# Patient Record
Sex: MALE | Race: BLACK OR AFRICAN AMERICAN | Employment: FULL TIME | URBAN - METROPOLITAN AREA
[De-identification: names, ages, dates, MRNs, and addresses within clinical notes are randomized per-mention and may not be internally consistent; named-entity substitution may affect disease eponyms.]

---

## 2023-02-27 ENCOUNTER — OFFICE VISIT (OUTPATIENT)
Dept: ORTHOPEDIC SURGERY | Age: 43
End: 2023-02-27
Payer: COMMERCIAL

## 2023-02-27 VITALS — HEIGHT: 68 IN | BODY MASS INDEX: 36.98 KG/M2 | WEIGHT: 244 LBS

## 2023-02-27 DIAGNOSIS — M25.562 KNEE MENISCUS PAIN, LEFT: ICD-10-CM

## 2023-02-27 DIAGNOSIS — M25.562 LEFT MEDIAL KNEE PAIN: ICD-10-CM
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
## 2023-02-27 DIAGNOSIS — S83.242A ACUTE MEDIAL MENISCAL TEAR, LEFT, INITIAL ENCOUNTER: ICD-10-CM

## 2023-02-27 DIAGNOSIS — M25.562 ACUTE PAIN OF LEFT KNEE: Primary | ICD-10-CM

## 2023-02-27 PROCEDURE — 99204 OFFICE O/P NEW MOD 45 MIN: CPT | Performed by: ORTHOPAEDIC SURGERY
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
## 2023-03-07 ENCOUNTER — OFFICE VISIT (OUTPATIENT)
Dept: ORTHOPEDIC SURGERY | Age: 43
End: 2023-03-07
Payer: COMMERCIAL

## 2023-03-07 VITALS — HEIGHT: 68 IN | WEIGHT: 244 LBS | BODY MASS INDEX: 36.98 KG/M2

## 2023-03-07 DIAGNOSIS — M25.562 KNEE MENISCUS PAIN, LEFT: ICD-10-CM

## 2023-03-07 DIAGNOSIS — M25.562 LEFT MEDIAL KNEE PAIN: ICD-10-CM

## 2023-03-07 DIAGNOSIS — M25.562 ACUTE PAIN OF LEFT KNEE: Primary | ICD-10-CM

## 2023-03-07 DIAGNOSIS — M22.42 PATELLA, CHONDROMALACIA, LEFT: ICD-10-CM

## 2023-03-07 DIAGNOSIS — S83.242D ACUTE MEDIAL MENISCUS TEAR, LEFT, SUBSEQUENT ENCOUNTER: ICD-10-CM

## 2023-03-07 DIAGNOSIS — S83.282D TEAR OF LATERAL MENISCUS OF LEFT KNEE, SUBSEQUENT ENCOUNTER: ICD-10-CM

## 2023-03-07 DIAGNOSIS — M25.562 LEFT LATERAL KNEE PAIN: ICD-10-CM

## 2023-03-07 PROCEDURE — 99214 OFFICE O/P EST MOD 30 MIN: CPT | Performed by: ORTHOPAEDIC SURGERY

## 2023-03-07 NOTE — PROGRESS NOTES
Deanna Landrum (: 1980) is a 39 y.o. male, patient, here for evaluation of the following chief complaint(s):  Knee Pain (left)       HPI:    He was last seen for his left knee pain on 2023. Since then, the patient did have an MRI performed on his left knee on 3/4/2023. The patient states that his pain level is the same as it was at his last visit. He rates the severity of his left knee pain as a 5 out of 10. He describes his left knee pain as sharp, aching, and constant. His left knee pain does not wake him up from sleep at night. He has been experiencing some swelling and weakness in his left knee. The patient reports taking no medication for his discomfort. Left knee MRI images and results were independently reviewed and they were consistent with a small undersurface tear periphery junction body and posterior horn of the medial meniscus. Subtle free edge tear the body of the lateral meniscus. Mild cartilage loss in the patellofemoral compartment. No Known Allergies    Current Outpatient Medications   Medication Sig    oxyCODONE IR (ROXICODONE) 5 mg immediate release tablet Take 1-2 Tabs by mouth every four (4) hours as needed for Pain. Max Daily Amount: 60 mg. No current facility-administered medications for this visit. Past Medical History:   Diagnosis Date    Fracture of distal phalanx of finger, open 2015    LEFT INDEX        Past Surgical History:   Procedure Laterality Date    HX HEENT      RIGHT EYE       No family history on file.      Social History     Socioeconomic History    Marital status: SINGLE     Spouse name: Not on file    Number of children: Not on file    Years of education: Not on file    Highest education level: Not on file   Occupational History    Not on file   Tobacco Use    Smoking status: Never    Smokeless tobacco: Not on file   Substance and Sexual Activity    Alcohol use: Yes    Drug use: Not on file    Sexual activity: Not on file   Other Topics Concern    Not on file   Social History Narrative    Not on file     Social Determinants of Health     Financial Resource Strain: Not on file   Food Insecurity: Not on file   Transportation Needs: Not on file   Physical Activity: Not on file   Stress: Not on file   Social Connections: Not on file   Intimate Partner Violence: Not on file   Housing Stability: Not on file       Review of Systems   All other systems reviewed and are negative. Vitals:  Ht 5' 8\" (1.727 m)   Wt 244 lb (110.7 kg)   BMI 37.10 kg/m²    Body mass index is 37.1 kg/m². Ortho Exam     The patient is well-developed and well-nourished. The patient presents today in alert and oriented x3 with a normal mood and affect. The patient stands with a normal weightbearing line but walks with a slightly antalgic gait because of his left knee pain. Left knee is mildly tender to palpation along the medial and lateral joint line and has no significant effusion. The patient has positive Baldemar´s test and the knee is stable. He does have pain with patellar compression and quadriceps contraction with crepitus. There is a lack full flexion secondary to the effusion but does have full extension. There is 5/5 strength. The knee and lower extremity is neurovascularly intact. There is no erythema or warmth the skin is normal.      ASSESSMENT/PLAN:      1. Acute pain of left knee  2. Left medial knee pain  3. Knee meniscus pain, left  4. Acute medial meniscus tear, left, subsequent encounter  -     REFERRAL TO PHYSICAL THERAPY  5. Left lateral knee pain  6. Tear of lateral meniscus of left knee, subsequent encounter  -     REFERRAL TO PHYSICAL THERAPY  7. Patella, chondromalacia, left  -     REFERRAL TO PHYSICAL THERAPY     Below is the assessment and plan developed based on review of pertinent history, physical exam, labs, studies, and medications. **The patient was referred to formal physical therapy. **    We discussed the patient's ongoing left knee pain and we independently reviewed his MRI images and results. The radiologist noted a small undersurface tear periphery junction body and posterior horn of the medial meniscus. Subtle free edge tear the body of the lateral meniscus. Mild cartilage loss in the patellofemoral compartment. We independently reviewed the images ourselves as noted above and we were not very impressed with either the medial or lateral meniscus tears. The possible treatment options were discussed with the patient and we elected to treat his pain with rest, ice, elevation, activity modification, and anti-inflammatory medication. We did discuss with the patient the risks and benefits of a left knee arthroscopic exam with chondroplasty and he will consider this procedure if he continues to have persistence of his pain moving forward. The patient will work on range of motion, strengthening, and stretching exercises with both formal physical therapy and with an at-home exercise program as pain tolerates. He is to avoid any deep knee bend activities against resistance, squatting, kneeling, stairs, lunging, cutting, twisting, change of direction, and high impact loading activities. I we will see him back in 4 weeks for reevaluation if he has continued persistence of his pain however, if his pain has improved and is well-maintained I will see him back on an as-needed basis at which point we would discuss alternative treatment options. Return in about 4 weeks (around 4/4/2023) for Re-evaluation and further discussion. An electronic signature was used to authenticate this note.   -- Karlynn Lanes, MD

## 2023-05-17 RX ORDER — OXYCODONE HYDROCHLORIDE 5 MG/1
5-10 TABLET ORAL EVERY 4 HOURS PRN
COMMUNITY
Start: 2015-09-23